# Patient Record
Sex: FEMALE | Race: WHITE | NOT HISPANIC OR LATINO
[De-identification: names, ages, dates, MRNs, and addresses within clinical notes are randomized per-mention and may not be internally consistent; named-entity substitution may affect disease eponyms.]

---

## 2018-09-17 ENCOUNTER — APPOINTMENT (OUTPATIENT)
Dept: OBGYN | Facility: CLINIC | Age: 78
End: 2018-09-17
Payer: MEDICARE

## 2018-09-17 VITALS
HEIGHT: 60 IN | WEIGHT: 145 LBS | SYSTOLIC BLOOD PRESSURE: 148 MMHG | DIASTOLIC BLOOD PRESSURE: 76 MMHG | BODY MASS INDEX: 28.47 KG/M2

## 2018-09-17 DIAGNOSIS — R73.03 PREDIABETES.: ICD-10-CM

## 2018-09-17 DIAGNOSIS — E11.9 TYPE 2 DIABETES MELLITUS W/OUT COMPLICATIONS: ICD-10-CM

## 2018-09-17 DIAGNOSIS — E78.1 PURE HYPERGLYCERIDEMIA: ICD-10-CM

## 2018-09-17 DIAGNOSIS — Z01.419 ENCOUNTER FOR GYNECOLOGICAL EXAMINATION (GENERAL) (ROUTINE) W/OUT ABNORMAL FINDINGS: ICD-10-CM

## 2018-09-17 PROCEDURE — 99387 INIT PM E/M NEW PAT 65+ YRS: CPT

## 2018-09-20 LAB
CYTOLOGY CVX/VAG DOC THIN PREP: NORMAL
HPV HIGH+LOW RISK DNA PNL CVX: NOT DETECTED

## 2022-09-08 ENCOUNTER — APPOINTMENT (OUTPATIENT)
Dept: ORTHOPEDIC SURGERY | Facility: CLINIC | Age: 82
End: 2022-09-08

## 2022-09-08 VITALS — HEIGHT: 60 IN | WEIGHT: 145 LBS | BODY MASS INDEX: 28.47 KG/M2

## 2022-09-08 DIAGNOSIS — S82.842A DISPLACED BIMALLEOLAR FRACTURE OF LEFT LOWER LEG, INITIAL ENCOUNTER FOR CLOSED FRACTURE: ICD-10-CM

## 2022-09-08 PROCEDURE — 27808 TREATMENT OF ANKLE FRACTURE: CPT

## 2022-09-08 PROCEDURE — L4361: CPT | Mod: LT

## 2022-09-08 PROCEDURE — 99203 OFFICE O/P NEW LOW 30 MIN: CPT | Mod: 25

## 2022-09-08 NOTE — HISTORY OF PRESENT ILLNESS
[3] : 3 [de-identified] : 09/08/2022: fall 2 days ago injury her ankle. Went to [facility] and had xrays done and put in a splint. NWB in wheelchair. no prior ankle probs. +dm (a1c 6.0; denies neuropathy/ulcers) no tob.  [] : no [FreeTextEntry1] : LT ankle  [FreeTextEntry3] : 9/6/22 [de-identified] : splint andh wheelchair  [de-identified] : [facility]  [de-identified] : xray

## 2022-09-08 NOTE — DATA REVIEWED
[Outside X-rays] : outside x-rays [Left] : left [Ankle] : ankle [I reviewed the films/CD and additionally noted] : I reviewed the films/CD and additionally noted [FreeTextEntry1] : min displaced distal fibula fx; non displaced infracollicular medial mall fx

## 2022-09-08 NOTE — PHYSICAL EXAM
[Left] : left foot and ankle [Mild] : mild diffused ankle swelling [5___] : Replaced by [facility] 5[unfilled]/5 [2+] : dorsalis pedis pulse: 2+ [] : uses wheelchair [de-identified] : plantar flexion 30 degrees [TWNoteComboBox7] : dorsiflexion 10 degrees

## 2022-09-22 ENCOUNTER — APPOINTMENT (OUTPATIENT)
Dept: ORTHOPEDIC SURGERY | Facility: CLINIC | Age: 82
End: 2022-09-22

## 2022-09-22 VITALS — HEIGHT: 60 IN | WEIGHT: 145 LBS | BODY MASS INDEX: 28.47 KG/M2

## 2022-09-22 PROCEDURE — 99024 POSTOP FOLLOW-UP VISIT: CPT

## 2022-09-22 PROCEDURE — 73610 X-RAY EXAM OF ANKLE: CPT | Mod: LT

## 2022-09-22 NOTE — HISTORY OF PRESENT ILLNESS
[0] : 0 [de-identified] : 09/08/2022: fall 2 days ago injury her ankle. Went to [facility] and had xrays done and put in a splint. NWB in wheelchair. no prior ankle probs. +dm (a1c 6.0; denies neuropathy/ulcers) no tob. \par \par 09/22/2022:  pain improving. wearing boot - weight bearing at times [] : Post Surgical Visit: no [FreeTextEntry1] : LT ankle  [FreeTextEntry3] : 9/6/22 [de-identified] : boot and wheelchair wheelchair  [de-identified] : [facility]  [de-identified] : xray

## 2022-09-22 NOTE — PHYSICAL EXAM
[Left] : left foot and ankle [Mild] : mild diffused ankle swelling [5___] : [facility] 5[unfilled]/5 [2+] : dorsalis pedis pulse: 2+ [] : no achilles tendon tenderness [FreeTextEntry8] : improved [de-identified] : plantar flexion 30 degrees [TWNoteComboBox7] : dorsiflexion 10 degrees

## 2022-10-13 ENCOUNTER — APPOINTMENT (OUTPATIENT)
Dept: ORTHOPEDIC SURGERY | Facility: CLINIC | Age: 82
End: 2022-10-13

## 2022-10-13 PROCEDURE — 73610 X-RAY EXAM OF ANKLE: CPT | Mod: LT

## 2022-10-13 PROCEDURE — L4350: CPT | Mod: LT

## 2022-10-13 PROCEDURE — 99024 POSTOP FOLLOW-UP VISIT: CPT

## 2022-10-13 NOTE — HISTORY OF PRESENT ILLNESS
[0] : 0 [de-identified] : 09/08/2022: fall 2 days ago injury her ankle. Went to [facility] and had xrays done and put in a splint. NWB in wheelchair. no prior ankle probs. +dm (a1c 6.0; denies neuropathy/ulcers) no tob. \par \par 09/22/2022:  pain improving. wearing boot - weight bearing at times\par \par 10/13/2022: improving. walking in boot [] : Post Surgical Visit: no [FreeTextEntry1] : LT ankle  [FreeTextEntry3] : 9/6/22 [de-identified] : boot and wheelchair wheelchair  [de-identified] : [facility]  [de-identified] : xray

## 2022-10-13 NOTE — PHYSICAL EXAM
[Left] : left foot and ankle [Mild] : mild diffused ankle swelling [2+] : dorsalis pedis pulse: 2+ [NL (40)] : plantar flexion 40 degrees [NL 30)] : inversion 30 degrees [NL (20)] : eversion 20 degrees [5___] : eversion 5[unfilled]/5 [] : patient ambulates without assistive device [FreeTextEntry8] : improved [de-identified] : plantar flexion 30 degrees [TWNoteComboBox7] : dorsiflexion 10 degrees

## 2022-10-13 NOTE — ASSESSMENT
[FreeTextEntry1] : transition to [employer]\par already full weight bearing\par ice/elevate\par tylenol prn\par f/up 3 wks w/ ankle xray

## 2022-11-03 ENCOUNTER — APPOINTMENT (OUTPATIENT)
Dept: ORTHOPEDIC SURGERY | Facility: CLINIC | Age: 82
End: 2022-11-03

## 2022-11-03 VITALS — HEIGHT: 60 IN | BODY MASS INDEX: 28.47 KG/M2 | WEIGHT: 145 LBS

## 2022-11-03 PROCEDURE — 73610 X-RAY EXAM OF ANKLE: CPT | Mod: LT

## 2022-11-03 PROCEDURE — 99024 POSTOP FOLLOW-UP VISIT: CPT

## 2022-11-03 NOTE — HISTORY OF PRESENT ILLNESS
[0] : 0 [de-identified] : 09/08/2022: fall 2 days ago injury her ankle. Went to [facility] and had xrays done and put in a splint. NWB in wheelchair. no prior ankle probs. +dm (a1c 6.0; denies neuropathy/ulcers) no tob. \par \par 09/22/2022:  pain improving. wearing boot - weight bearing at times\par \par 10/13/2022: improving. walking in boot.\par \par 11/3/2022: no complaints. walking in regular shoes w/ brace.  [] : Post Surgical Visit: no [FreeTextEntry1] : LT ankle  [FreeTextEntry3] : 9/6/22 [de-identified] : brace [de-identified] : [facility]  [de-identified] : xray

## 2022-11-03 NOTE — PHYSICAL EXAM
[Left] : left foot and ankle [NL (40)] : plantar flexion 40 degrees [NL 30)] : inversion 30 degrees [NL (20)] : eversion 20 degrees [5___] : [facility] 5[unfilled]/5 [2+] : dorsalis pedis pulse: 2+ [] : no achilles tendon tenderness

## 2022-12-15 ENCOUNTER — APPOINTMENT (OUTPATIENT)
Dept: ORTHOPEDIC SURGERY | Facility: CLINIC | Age: 82
End: 2022-12-15

## 2022-12-15 VITALS — BODY MASS INDEX: 28.47 KG/M2 | WEIGHT: 145 LBS | HEIGHT: 60 IN

## 2022-12-15 DIAGNOSIS — S82.842D DISPLACED BIMALLEOLAR FRACTURE OF LEFT LOWER LEG, SUBSEQUENT ENCOUNTER FOR CLOSED FRACTURE WITH ROUTINE HEALING: ICD-10-CM

## 2022-12-15 PROCEDURE — 99213 OFFICE O/P EST LOW 20 MIN: CPT

## 2022-12-15 PROCEDURE — 73610 X-RAY EXAM OF ANKLE: CPT | Mod: LT

## 2022-12-15 NOTE — HISTORY OF PRESENT ILLNESS
[0] : 0 [de-identified] : 09/08/2022: fall 2 days ago injury her ankle. Went to [facility] and had xrays done and put in a splint. NWB in wheelchair. no prior ankle probs. +dm (a1c 6.0; denies neuropathy/ulcers) no tob. \par \par 09/22/2022:  pain improving. wearing boot - weight bearing at times\par \par 10/13/2022: improving. walking in boot.\par \par 11/3/2022: no complaints. walking in regular shoes w/ brace. \par \par 12/15/2022: no complaints. walking in regular shoes.  [] : Post Surgical Visit: no [FreeTextEntry1] : LT ankle  [FreeTextEntry3] : 9/6/22 [de-identified] : brace [de-identified] : [facility]  [de-identified] : xray

## 2022-12-15 NOTE — PHYSICAL EXAM
[Left] : left foot and ankle [NL (40)] : plantar flexion 40 degrees [NL 30)] : inversion 30 degrees [NL (20)] : eversion 20 degrees [5___] : [facility] 5[unfilled]/5 [2+] : dorsalis pedis pulse: 2+ [] : no achilles tendon tenderness